# Patient Record
Sex: MALE | Race: WHITE | Employment: UNEMPLOYED | URBAN - METROPOLITAN AREA
[De-identification: names, ages, dates, MRNs, and addresses within clinical notes are randomized per-mention and may not be internally consistent; named-entity substitution may affect disease eponyms.]

---

## 2017-10-05 ENCOUNTER — OFFICE VISIT - HEALTHEAST (OUTPATIENT)
Dept: FAMILY MEDICINE | Facility: CLINIC | Age: 52
End: 2017-10-05

## 2017-10-05 DIAGNOSIS — F41.9 ANXIETY: ICD-10-CM

## 2017-10-05 DIAGNOSIS — R19.7 DIARRHEA: ICD-10-CM

## 2017-10-05 DIAGNOSIS — R00.2 PALPITATIONS: ICD-10-CM

## 2017-10-05 DIAGNOSIS — F32.A DEPRESSION: ICD-10-CM

## 2017-10-05 LAB
ATRIAL RATE - MUSE: 58 BPM
DIASTOLIC BLOOD PRESSURE - MUSE: NORMAL MMHG
INTERPRETATION ECG - MUSE: NORMAL
P AXIS - MUSE: 22 DEGREES
PR INTERVAL - MUSE: 176 MS
QRS DURATION - MUSE: 86 MS
QT - MUSE: 420 MS
QTC - MUSE: 412 MS
R AXIS - MUSE: 13 DEGREES
SYSTOLIC BLOOD PRESSURE - MUSE: NORMAL MMHG
T AXIS - MUSE: 19 DEGREES
VENTRICULAR RATE- MUSE: 58 BPM

## 2017-10-05 ASSESSMENT — MIFFLIN-ST. JEOR: SCORE: 1797.87

## 2020-03-05 ENCOUNTER — OFFICE VISIT (OUTPATIENT)
Dept: URGENT CARE | Facility: URGENT CARE | Age: 55
End: 2020-03-05

## 2020-03-05 VITALS
SYSTOLIC BLOOD PRESSURE: 130 MMHG | HEART RATE: 80 BPM | TEMPERATURE: 97.4 F | RESPIRATION RATE: 18 BRPM | OXYGEN SATURATION: 98 % | DIASTOLIC BLOOD PRESSURE: 62 MMHG

## 2020-03-05 DIAGNOSIS — H10.32 ACUTE CONJUNCTIVITIS OF LEFT EYE, UNSPECIFIED ACUTE CONJUNCTIVITIS TYPE: Primary | ICD-10-CM

## 2020-03-05 PROCEDURE — 99203 OFFICE O/P NEW LOW 30 MIN: CPT | Performed by: FAMILY MEDICINE

## 2020-03-05 RX ORDER — TRAZODONE HYDROCHLORIDE 50 MG/1
50 TABLET, FILM COATED ORAL
COMMUNITY
Start: 2016-05-04

## 2020-03-05 RX ORDER — PSYLLIUM HUSK/CALCIUM CARB 1 G-60 MG
CAPSULE ORAL
COMMUNITY
Start: 2008-01-21

## 2020-03-05 RX ORDER — ESCITALOPRAM OXALATE 20 MG/1
20 TABLET ORAL
COMMUNITY
Start: 2017-05-05

## 2020-03-05 RX ORDER — TOBRAMYCIN 3 MG/ML
1 SOLUTION/ DROPS OPHTHALMIC
Qty: 5 ML | Refills: 0 | Status: SHIPPED | OUTPATIENT
Start: 2020-03-05 | End: 2020-03-12

## 2020-03-05 RX ORDER — CYCLOBENZAPRINE HCL 10 MG
10 TABLET ORAL
COMMUNITY
Start: 2017-04-07

## 2020-03-05 ASSESSMENT — PAIN SCALES - GENERAL: PAINLEVEL: SEVERE PAIN (7)

## 2020-03-05 NOTE — PROGRESS NOTES
SUBJECTIVE:  Chief Complaint:   Chief Complaint   Patient presents with     Urgent Care     Eye Problem     Pt was mixing concrete last night and thinks he may have gotten particles of contrete or something in left eye. Burning sensation a little, redness. No visual changes.      History of Present Illness:  Manjeet Perry is a 55 year old male who presents complaining of left eye pain, redness, possible foreign body for 1 day(s).   Onset/timing: sudden, worsening.    Associated Signs and Symptoms: none  Treatment measures tried include: flushed with water   Contact wearer : No     Patient was working on house cleaning and painting, dry particle on ceiling got into his left eye yesterday.  Did try to flush this out.  Symptoms persistent and still feels like something is still in eye.  No vision changes    No past medical history on file.  Current Outpatient Medications   Medication Sig Dispense Refill     cyclobenzaprine (FLEXERIL) 10 MG tablet Take 10 mg by mouth       escitalopram (LEXAPRO) 20 MG tablet Take 20 mg by mouth       multivitamin, therapeutic with minerals (MULTI-VITAMIN) TABS Take 1 tablet by mouth daily. 100 tablet 3     Psyllium-Calcium (METAMUCIL PLUS CALCIUM) CAPS        traZODone (DESYREL) 50 MG tablet Take 50 mg by mouth          ROS:  Review of systems negative except as stated above.    OBJECTIVE:  /62   Pulse 80   Temp 97.4  F (36.3  C) (Tympanic)   Resp 18   SpO2 98%   General: no acute distress  Eye exam: right eye normal lid, conjunctiva, cornea, pupil and fundus, left eye abnormal findings: conjunctivitis with erythema, no foreign body, no corneal abrasion on fluorescein dye  Psych: alert, affect bright    ASSESSMENT/PLAN:  (H10.32) Acute conjunctivitis of left eye, unspecified acute conjunctivitis type  (primary encounter diagnosis)  Plan: tobramycin (TOBREX) 0.3 % ophthalmic solution            Tetracaine eye drop given with good relief of pain.  fluorescein dye with no foreign  body or overt corneal abrasion.    Discussed possible small abrasion that is not overt vs bacterial conjunctivitis due to debris.  RX tobrex given to use.  Okay for tylenol and ibuprofen for discomfort    Follow up with eye clinic if no improvement of symptoms in 1 week    Bradley Whitley MD, MD  March 5, 2020 2:44 PM

## 2020-03-05 NOTE — PATIENT INSTRUCTIONS
Patient Education     Corneal Abrasion    You have received a scratch or scrape (abrasion) to your cornea. The cornea is the clear part in the front of the eye. This sensitive area is very painful when injured. You may make tears frequently, and your vision may be blurry until the injury heals. You may be sensitive to light.  This part of the body heals quickly. You can expect the pain to go away within 24 to 48 hours. If the abrasion is large or deep, your doctor may apply an eye patch, although this is not always done. An antibiotic ointment or eye drops may also be used to prevent infection.  Numbing drops may be used to relieve the pain temporarily so that your eyes can be examined. But these drops can t be prescribed for home use because that would prevent healing and lead to more serious problems. Also, if you can t feel your eye, there is a chance of accidentally injuring it further without knowing it.  Home care    A cold pack may be applied over the eye (or eye patch) for 20 minutes at a time, to reduce pain. To make a cold pack, put ice cubes in a plastic bag that seals at the top. Wrap the bag in a clean, thin towel or cloth.    You may use acetaminophen or ibuprofen to control pain, unless another pain medicine was prescribed. Note: If you have chronic liver or kidney disease or have ever had a stomach ulcer or GI bleeding, talk with your doctor before using these medicines.    Rest your eyes and don t read until symptoms are gone.    If you use contact lenses, don t wear them until all symptoms are gone.    If your vision is affected by the corneal abrasion or if an eye patch was applied, don t drive a motor vehicle or operate machinery until all symptoms are gone. You may have trouble judging distances using only one eye.    If your eyes are sensitive to light, try wearing sunglasses, or stay indoors until symptoms go away.    Follow-up care  Follow up with your healthcare provider, or as  advised.    If no patch was put on your eye and the pain continues for more than 48 hours, you should have another exam. Contact your healthcare provider to arrange this.    If your eye was patched and you were asked to remove the patch yourself, see your healthcare provider. Contact your healthcare provider if you still have pain after the patch is removed.    If you were given a return appointment for patch removal and re-examination, be sure to keep the appointment. Leaving the patch in place longer than advised could be harmful.  When to seek medical advice  Call your healthcare provider right away if any of these occur.    Increasing eye pain or pain that does not improve after 24 hours    Discharge from the eye    Increasing redness of the eye or swelling of the eyelids    Worsening vision    Symptoms get worse after the abrasion has healed  Date Last Reviewed: 7/1/2017 2000-2019 [employer]. [address] [place] [address]. All rights reserved. This information is not intended as a substitute for professional medical care. Always follow your healthcare professional's instructions.           Patient Education     Bacterial Conjunctivitis    You have an infection in the membranes covering the white part of the eye. This part of the eye is called the conjunctiva. The infection is called conjunctivitis. The most common symptoms of conjunctivitis include a thick, pus-like discharge from the eye, swollen eyelids, redness, eyelids sticking together upon awakening, and a gritty or scratchy feeling in the eye. Your infection was caused by bacteria. It may be treated with medicine. With treatment, the infection takes about 7 to 10 days to resolve.  Home care    Use prescribed antibiotic eye drops or ointment as directed to treat the infection.    Apply a warm compress (towel soaked in warm water) to the affected eye 3 to 4 times a day. Do this just before applying medicine to the eye.    Use  a warm, wet cloth to wipe away crusting of the eyelids in the morning. This is caused by mucus drainage during the night. You may also use saline irrigating solution or artificial tears to rinse away mucus in the eye. Do not put a patch over the eye.    Wash your hands before and after touching the infected eye. This is to prevent spreading the infection to the other eye, and to other people. Don't share your towels or washcloths with others.    You may use acetaminophen or ibuprofen to control pain, unless another medicine was prescribed. (Note: If you have chronic liver or kidney disease or have ever had a stomach ulcer or gastrointestinal bleeding, talk with your doctor before using these medicines.)    Don't wear contact lenses until your eyes have healed and all symptoms are gone.  Follow-up care  Follow up with your healthcare provider, or as advised.  When to seek medical advice  Call your healthcare provider right away if any of these occur:    Worsening vision    Increasing pain in the eye    Increasing swelling or redness of the eyelid    Redness spreading around the eye  Date Last Reviewed: 7/1/2017 2000-2019 [employer]. [address] [place] [address], [place] [address]. All rights reserved. This information is not intended as a substitute for professional medical care. Always follow your healthcare professional's instructions.

## 2021-05-31 VITALS — BODY MASS INDEX: 30.51 KG/M2 | HEIGHT: 70 IN | WEIGHT: 213.1 LBS

## 2021-06-01 ENCOUNTER — RECORDS - HEALTHEAST (OUTPATIENT)
Dept: ADMINISTRATIVE | Facility: CLINIC | Age: 56
End: 2021-06-01

## 2021-06-02 ENCOUNTER — RECORDS - HEALTHEAST (OUTPATIENT)
Dept: ADMINISTRATIVE | Facility: CLINIC | Age: 56
End: 2021-06-02

## 2021-06-13 NOTE — PROGRESS NOTES
Assessment/Plan:        Diagnoses and all orders for this visit:    Palpitations  -     Electrocardiogram Perform - Clinic  -     2(CBC w/o Differential)  -     Thyroid Stimulating Hormone (TSH)  -     Basic Metabolic Panel  -     GILBERT Hook-Up; Future; Expected date: 10/5/17    Diarrhea    Anxiety    Depression    Other orders  -     Discontinue: escitalopram oxalate (LEXAPRO) 20 MG tablet; TAKE 1 TAB BY MOUTH DAILY.; Refill: 3  -     traZODone (DESYREL) 50 MG tablet; Take 50 mg by mouth as needed for sleep.  -     escitalopram oxalate (LEXAPRO) 20 MG tablet; TAKE 1 TAB BY MOUTH DAILY.  Dispense: 90 tablet; Refill: 1        Discussed differentials.  High likelihood of anxiety and depression causing his symptoms, stress from work.  Family history of cardiac disease.  We will do labs.  EKG done.  Findings were showing sinus bradycardia.  No acute ST-T wave changes.  Will do an event monitor.  We will also do labs.  If all workups are negative, likely related to his stress.  No further evaluation at that point.  If Lexapro does not seem to provide more benefit or if symptoms worse, advised reevaluation.  Encouraged to reestablish with his therapist.  Modifying his priorities and taking better care of his health.  Improve lifestyle, exercise.  He was agreeable with the plans.  Subjective:    Patient ID: Manjeet Perry is a 52 y.o. male.    HPI    Manjeet is here with several concerns.  He has been having palpitations.  Has had this for a number of years.  Seems to be worse in the past 4 weeks.  He had an episode 4 weeks ago which lasted for a minute which is longer than before.  2 episodes 2 weeks ago.  He has been under a lot of stress at work.  Works at [employer], high stress [employer].  He feels that symptoms are associated with stress or when he is emotional.  Describes it as pounding, fast or jumping, skips a beat.  Usually last for a few seconds.  Denies any fever with it, nausea, vomiting, chest pain.  No shortness of  breath, numbness or weakness with it.  Denies any recent changes in his diet, or new herbal supplements.  Has high caffeine intake.  Has had dizziness in the past especially with moving fast.  Denies any syncopal events recently.  Has not had any further evaluation before.  Father had MI in his late 40s.  Recalls of having a stress test in 2008 and normal.  No other workup for his conditions. He tries to power walk with his dogs almost every day.  He tries to use his treadmill during the winter for around 30 minutes.    He is currently on Lexapro.  Has been on it for a number of years.  He feels that it is providing benefit.  Wife also notes that he is not as agitated or short fused.  He tends to get irritated with small things.  He hates living right now.  He tends to put himself as the least priority.  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  He feels that he has a hard time changing this as his mom is similar.  He gets frustrated quite easily.  Mom passed away 1 and half years ago and he was close to her.  She does not have any other family members here.  He lived in [place] before.  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  He hopes to retire in 10 years and move [place] or in [place].  Father is 85 and legally blind.  He is now dealing with Alzheimer's.  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  Both him and his brother are managing his finances.  He misses advice is that he gets from his father.  He gets emotional when he is dealing with stocks as how he was able to before with his father before his Alzheimer's.  Sometimes if he hears songs, it triggers memories and he gets emotional.  He also cites an example where he got acquainted with an old friend whom he has not seen for 15 years.  They were talking about the past including his previous girlfriend.  After the encounter, he started thinking about her whom he dated  around 13 years ago.  Set off panic attacks for around 4-5 days.  He is not in love with her anymore but wonders why he has these changes.  Wife is aware of reasons for his panic attack.  He was seeing a therapist before but is now out of network and quite a distance for him to travel.  He also takes trazodone once or twice a month if he has difficulty with sleep especially with travel or if he wakes up in his mind would race.  Denies any thoughts of hurting himself or ending life at this time.  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  There are only 2  and the other one would also retire.  He has not had any vacation leave as he is a workaholic.  Wife is also having trouble with her health at this time and having difficulty with her boss.  He has been feeling sad for the past 3 years.  PHQ 9 score of 4, ELVIRA 7 score of 5.    Had diarrhea 2 weeks ago.  It lasted for a week.  Would have 5-6 loose watery stools.  Had 3 episodes where he noted blood spots.  Unsure if he had any dark food products at that time.  Blood when he wipes himself.  Did not turn the water pink or red.  He denies any colon issues in the past.  The episodes before we had intermittent diarrhea.  His last colonoscopy was in July 2014 and recheck in 5 years.  Father had precancerous colon polyps.  Denies any recent unexplained weight loss.  Denies any changes in the stool caliber prior to his diarrhea.  No recent antibiotic use, fever or exposure to anyone ill.  No recent international trips.  Eats out a lot, fast foods, processed foods.  Trying to do more salads.      Review of Systems  As above otherwise negative.    Past medical history: As above.  Hearing loss in his left, tinnitus.  Migraine.  Seizure and the last episode was in 1996, unclear of cause.    Past surgical history: Surgery on his lumbar spine for fracture in 2005.     Family history: Father with  "precancerous colon polyps, macular degeneration, Alzheimer's, MI in his late 40s, diabetes, alcoholism.  Maternal grandmother with hypertension, diabetes.  Mom with breast cancer, COPD, glaucoma, cataract.    Social history: Denies any issues with smoking, alcohol or drugs.  Works at [employer], .        Objective:    Physical Exam  /70 (Patient Site: Right Arm, Patient Position: Sitting, Cuff Size: Adult Large)  Pulse 68  Ht 5' 10\" (1.778 m)  Wt 213 lb 1.6 oz (96.7 kg)  SpO2 99%  BMI 30.58 kg/m2    Vital signs noted above. AAO ×3.  HEENT no nasal discharge, moist oral mucosa. Neck: Supple neck, nonpalpable cervical lymph nodes.  No thyromegaly.  Lungs: Clear to auscultation bilateral.  Heart: S1-S2 regular rate and rhythm, no murmurs were noted.  Abdomen: Flabby, soft with bowel sounds and nontender.  Extremities: No edema, pulses were full and equal.        "

## 2021-08-07 ENCOUNTER — HEALTH MAINTENANCE LETTER (OUTPATIENT)
Age: 56
End: 2021-08-07

## 2021-10-02 ENCOUNTER — HEALTH MAINTENANCE LETTER (OUTPATIENT)
Age: 56
End: 2021-10-02

## 2022-08-28 ENCOUNTER — HEALTH MAINTENANCE LETTER (OUTPATIENT)
Age: 57
End: 2022-08-28

## 2023-01-14 ENCOUNTER — HEALTH MAINTENANCE LETTER (OUTPATIENT)
Age: 58
End: 2023-01-14

## 2023-09-30 ENCOUNTER — HEALTH MAINTENANCE LETTER (OUTPATIENT)
Age: 58
End: 2023-09-30